# Patient Record
Sex: FEMALE | Race: WHITE | NOT HISPANIC OR LATINO
[De-identification: names, ages, dates, MRNs, and addresses within clinical notes are randomized per-mention and may not be internally consistent; named-entity substitution may affect disease eponyms.]

---

## 2020-10-23 ENCOUNTER — DASHBOARD ENCOUNTERS (OUTPATIENT)
Age: 40
End: 2020-10-23

## 2020-10-28 ENCOUNTER — OFFICE VISIT (OUTPATIENT)
Dept: URBAN - METROPOLITAN AREA CLINIC 82 | Facility: CLINIC | Age: 40
End: 2020-10-28
Payer: COMMERCIAL

## 2020-10-28 ENCOUNTER — WEB ENCOUNTER (OUTPATIENT)
Dept: URBAN - METROPOLITAN AREA CLINIC 82 | Facility: CLINIC | Age: 40
End: 2020-10-28

## 2020-10-28 VITALS
DIASTOLIC BLOOD PRESSURE: 78 MMHG | RESPIRATION RATE: 16 BRPM | TEMPERATURE: 97.1 F | BODY MASS INDEX: 28.34 KG/M2 | HEIGHT: 62 IN | HEART RATE: 60 BPM | WEIGHT: 154 LBS | SYSTOLIC BLOOD PRESSURE: 119 MMHG

## 2020-10-28 DIAGNOSIS — F45.8 AEROPHAGIA: ICD-10-CM

## 2020-10-28 DIAGNOSIS — R14.2 BELCHING: ICD-10-CM

## 2020-10-28 DIAGNOSIS — K20.0 EOSINOPHILIC ESOPHAGITIS: ICD-10-CM

## 2020-10-28 PROBLEM — 30693006: Status: ACTIVE | Noted: 2020-10-28

## 2020-10-28 PROBLEM — 235599003: Status: ACTIVE | Noted: 2020-10-28

## 2020-10-28 PROCEDURE — 99203 OFFICE O/P NEW LOW 30 MIN: CPT | Performed by: INTERNAL MEDICINE

## 2020-10-28 PROCEDURE — 99243 OFF/OP CNSLTJ NEW/EST LOW 30: CPT | Performed by: INTERNAL MEDICINE

## 2020-10-28 PROCEDURE — G8482 FLU IMMUNIZE ORDER/ADMIN: HCPCS | Performed by: INTERNAL MEDICINE

## 2020-10-28 RX ORDER — PEDI MULTIVIT NO.17 W-FLUORIDE 1 MG
TABLET,CHEWABLE ORAL
Qty: 0 | Refills: 0 | Status: ACTIVE | COMMUNITY
Start: 1900-01-01

## 2020-10-28 RX ORDER — LORATADINE 10 MG
1 PACKET MIXED WITH 8 OUNCES OF FLUID TABLET,DISINTEGRATING ORAL ONCE A DAY
Status: ACTIVE | COMMUNITY

## 2020-10-28 NOTE — HPI-TODAY'S VISIT:
Ms. Parada is a very pleasant 40-year-old white female with a history of eosinophilic esophagitis, well-controlled on dairy elimination, who presents today for excessive belching. She has a history of dysphagia that dates back to 2013 after the birth of her child.  Work-up revealed her to have eosinophilic esophagitis in November 2014.  She did have dilation at that time.  She was treated with PPIs and recommendations were made for swallowed oral inhaler.  She decided instead to seek second opinion. She works with a gastroenterologist who specialized in EOE and took her through a food elimination trial and reintroduction.  Ultimately she found she is only troubled by dairy therefore she avoids it completely. She no longer has dysphagia.  She denies reflux.  Once her EOE was under control she did start to notice she would occasionally have excessive throat.  This initially started after a trial of rate introduction of soy however she subsequently took it out for a period of time and it did not seem to help.  She subsequently has added soy back into her diet. Her prior gastroenterologist did test her for H. pylori and found to be negative through breath test.  She also had a normal abdominal ultrasound.  He was unable to determine etiology so she gave up until now. She does notice that stress is a trigger for her belching.  Sometimes the belching will go on for hours after a meal but usually eats a meal where she overeats particularly at dinnertime.  If she has belching she will sometimes have severe heartburn with it.  She has had a trial on proton pump inhibitor inhibitor by her prior gastroenterologist but this did not help her belching symptoms.